# Patient Record
Sex: MALE | Race: WHITE | NOT HISPANIC OR LATINO | Employment: OTHER | URBAN - METROPOLITAN AREA
[De-identification: names, ages, dates, MRNs, and addresses within clinical notes are randomized per-mention and may not be internally consistent; named-entity substitution may affect disease eponyms.]

---

## 2023-03-29 DIAGNOSIS — E78.5 HYPERLIPIDEMIA, UNSPECIFIED HYPERLIPIDEMIA TYPE: ICD-10-CM

## 2023-03-29 DIAGNOSIS — I10 PRIMARY HYPERTENSION: Primary | ICD-10-CM

## 2023-03-29 RX ORDER — LISINOPRIL 5 MG/1
1 TABLET ORAL DAILY
COMMUNITY
End: 2023-03-29 | Stop reason: SDUPTHER

## 2023-03-29 RX ORDER — ATORVASTATIN CALCIUM 40 MG/1
TABLET, FILM COATED ORAL
COMMUNITY
Start: 2018-06-01 | End: 2023-03-29 | Stop reason: SDUPTHER

## 2023-03-29 RX ORDER — LISINOPRIL 5 MG/1
5 TABLET ORAL DAILY
Qty: 90 TABLET | Refills: 1 | Status: SHIPPED | OUTPATIENT
Start: 2023-03-29 | End: 2023-11-29 | Stop reason: SDUPTHER

## 2023-03-29 RX ORDER — ATORVASTATIN CALCIUM 40 MG/1
40 TABLET, FILM COATED ORAL DAILY
Qty: 90 TABLET | Refills: 1 | Status: SHIPPED | OUTPATIENT
Start: 2023-03-29 | End: 2023-11-29 | Stop reason: SDUPTHER

## 2023-04-17 DIAGNOSIS — N40.1 BENIGN PROSTATIC HYPERPLASIA WITH LOWER URINARY TRACT SYMPTOMS, SYMPTOM DETAILS UNSPECIFIED: Primary | ICD-10-CM

## 2023-04-17 RX ORDER — TADALAFIL 5 MG/1
1 TABLET ORAL DAILY
COMMUNITY
Start: 2023-03-25 | End: 2023-04-17 | Stop reason: SDUPTHER

## 2023-04-17 RX ORDER — TADALAFIL 5 MG/1
5 TABLET ORAL DAILY
Qty: 90 TABLET | Refills: 3 | Status: SHIPPED | OUTPATIENT
Start: 2023-04-17 | End: 2024-04-17 | Stop reason: SDUPTHER

## 2023-06-18 ASSESSMENT — ENCOUNTER SYMPTOMS
FEVER: 1
SORE THROAT: 0
DYSURIA: 0
VOMITING: 0
NAUSEA: 0
WHEEZING: 0
HEADACHES: 0
ABDOMINAL PAIN: 0
DIARRHEA: 0
COUGH: 0

## 2023-06-18 NOTE — PROGRESS NOTES
Subjective   Patient ID: Vishnu Waller is a 77 y.o. male  with history of HTN, HLD, BPH who presents for Follow-up (UC, fever).  Fever   This is a new problem. The current episode started 1 to 4 weeks ago. The problem occurs rarely. The problem has been resolved. The maximum temperature noted was 103 to 103.9 F. The temperature was taken using an oral thermometer. Pertinent negatives include no abdominal pain, chest pain, congestion, coughing, diarrhea, ear pain, headaches, muscle aches, nausea, rash, sleepiness, sore throat, urinary pain, vomiting or wheezing.          Concerns today:  #Recent fever in [place]  -Arrived in [facility] on 6/4  -On 6/7, ate lunch, 3-4 hours later developed abdominal pain, 3 hours after that, he developed full body shakes and teeth chattering  -Fever 103, delirious, urinating on himself which is unusual  -Went to ED and got fluids   -Next morning, temp was 101.5 and took 2 tylenol, fever broke  -Had fever to 100.4 the next day, then resolved   -Two sick contacts with COVID  -All of his COVID tests were negative  -Got a UA in [facility] with small amount of blood, did not get a urine culture  -Got home Wednesday morning last week, played pickleball last week  -Now feeling like himself     Chronic issues:   #Erectile dysfunction/BPH  -Cialis 5mg QHS for both ED and BPH     #Minor stroke, MCA stenosis 2018  -ASA and atorva 40     #HTN  -lisinopril 5mg daily     #Knee OA   -Sees Dr. Ronald carias-last visit 11/16/22  -Received steroid injection     #Hx of MADINA     Health maintenance:  -UTD flu and covid vaccines  -UTD pneumococcal  -Tdap 2016  -Shingrix?-- will inquire at next visit   -do not see record of AAA screen- now over 74 yo, so not indicated   -Colonoscopy 2018 with 2 polyps removed    Objective   Visit Vitals  /87   Pulse 58   Temp 36.5 °C (97.7 °F)   Wt 83.5 kg (184 lb)   SpO2 95%   BMI 25.66 kg/m²   BSA 2.04 m²      Physical Exam  General: Well appearing, conversational, in no acute  distress  HEENT: EOMI, PERRL, nares patent without congestion, MMM, TMs clear bilaterally   CV: RRR, no murmurs  Resp: Lungs CTAB, normal work of breathing  GI: Soft, nondistended, nontender, BS+   Ext: Trace lower extremity swelling  Skin: Warm, dry, no rashes  Neuro: Awake, alert, oriented x3, moving all 4 extremities, nonfocal, normal gait, ambulates without assistance  Psych: Appropriate mood and affect      Assessment/Plan   Vishnu Waller is a 77 y.o. male who presents for follow-up from several days of fever (6/7-6/10) in [facility] with Tmax of 103 and no clear associated symptoms. He was delirious with fever, improved once he received tylenol and fluids. He had negative COVID and normal CBC. His UA showed some RBCs, otherwise normal. He most likely had a viral illness since it resolved without abx, though will repeat UA to ensure he no longer has RBCs. He may have had food poisoning, but he only had abdominal pain and gagging, no diarrhea or vomiting. He is very well appearing and back to himself, so will just check UA today.    Problem List Items Addressed This Visit    None  Visit Diagnoses       Hematuria, unspecified type    -  Primary    Relevant Orders    Urinalysis with Reflex Microscopic        Follow up in October for Medicare Annual Wellness          Alfreda Soto MD MPH

## 2023-06-19 ENCOUNTER — OFFICE VISIT (OUTPATIENT)
Dept: PRIMARY CARE | Facility: CLINIC | Age: 78
End: 2023-06-19
Payer: MEDICARE

## 2023-06-19 VITALS
TEMPERATURE: 97.7 F | DIASTOLIC BLOOD PRESSURE: 87 MMHG | SYSTOLIC BLOOD PRESSURE: 146 MMHG | OXYGEN SATURATION: 95 % | BODY MASS INDEX: 25.66 KG/M2 | HEART RATE: 58 BPM | WEIGHT: 184 LBS

## 2023-06-19 DIAGNOSIS — R31.9 HEMATURIA, UNSPECIFIED TYPE: Primary | ICD-10-CM

## 2023-06-19 LAB
APPEARANCE, URINE: CLEAR
BILIRUBIN, URINE: NEGATIVE
BLOOD, URINE: NEGATIVE
COLOR, URINE: YELLOW
GLUCOSE, URINE: NEGATIVE MG/DL
KETONES, URINE: NEGATIVE MG/DL
LEUKOCYTE ESTERASE, URINE: NEGATIVE
NITRITE, URINE: NEGATIVE
PH, URINE: 6 (ref 5–8)
PROTEIN, URINE: NEGATIVE MG/DL
SPECIFIC GRAVITY, URINE: 1.02 (ref 1–1.03)
UROBILINOGEN, URINE: <2 MG/DL (ref 0–1.9)

## 2023-06-19 PROCEDURE — 99213 OFFICE O/P EST LOW 20 MIN: CPT | Performed by: STUDENT IN AN ORGANIZED HEALTH CARE EDUCATION/TRAINING PROGRAM

## 2023-06-19 PROCEDURE — 81003 URINALYSIS AUTO W/O SCOPE: CPT

## 2023-06-19 PROCEDURE — 1157F ADVNC CARE PLAN IN RCRD: CPT | Performed by: STUDENT IN AN ORGANIZED HEALTH CARE EDUCATION/TRAINING PROGRAM

## 2023-06-19 NOTE — PATIENT INSTRUCTIONS
Thank you for coming today Vishnu!     I agree you likely had a viral illness such as adenovirus. We are rechecking your urine since they saw blood in it. I will let you know the results.    I am glad you are feeling better!    I will see you in October for your wellness or sooner if needed!

## 2023-07-25 ENCOUNTER — OFFICE VISIT (OUTPATIENT)
Dept: PRIMARY CARE | Facility: CLINIC | Age: 78
End: 2023-07-25
Payer: MEDICARE

## 2023-07-25 VITALS
DIASTOLIC BLOOD PRESSURE: 82 MMHG | OXYGEN SATURATION: 98 % | BODY MASS INDEX: 26.22 KG/M2 | WEIGHT: 188 LBS | SYSTOLIC BLOOD PRESSURE: 130 MMHG | HEART RATE: 61 BPM

## 2023-07-25 DIAGNOSIS — M70.21 OLECRANON BURSITIS OF RIGHT ELBOW: Primary | ICD-10-CM

## 2023-07-25 PROCEDURE — 99213 OFFICE O/P EST LOW 20 MIN: CPT | Performed by: STUDENT IN AN ORGANIZED HEALTH CARE EDUCATION/TRAINING PROGRAM

## 2023-07-25 PROCEDURE — 1157F ADVNC CARE PLAN IN RCRD: CPT | Performed by: STUDENT IN AN ORGANIZED HEALTH CARE EDUCATION/TRAINING PROGRAM

## 2023-07-25 PROCEDURE — 1126F AMNT PAIN NOTED NONE PRSNT: CPT | Performed by: STUDENT IN AN ORGANIZED HEALTH CARE EDUCATION/TRAINING PROGRAM

## 2023-07-25 ASSESSMENT — ENCOUNTER SYMPTOMS: FEVER: 1

## 2023-07-25 NOTE — PROGRESS NOTES
Subjective   Patient ID: Vishnu Waller is a 77 y.o. male  with history of HTN, HLD, BPH who presents for Follow-up.    Last seen on 6/19 for hematuria, confusion, and fever he developed while in [place]. This has completely resolved.      HPI:  Concerns today:  #R elbow mass  -Feels like he has some tennis elbow in R elbow  -Noticed a fluid filled mass that is starting to go down in size  -Not taking anything for pain  -Not red or warm  -Uses a paddle that is supposed to help protect elbow  -Still playing pickleball  -Made an appointment with Del Plummer for 9/26    Chronic issues:   #Erectile dysfunction/BPH  -Cialis 5mg QHS for both ED and BPH     #Minor stroke, MCA stenosis 2018  -ASA and atorva 40     #HTN  -lisinopril 5mg daily     #Knee OA   -Sees Dr. Ronald carias-last visit 11/16/22  -Received steroid injection     #Hx of MADINA     Health maintenance:  -UTD flu and covid vaccines  -UTD pneumococcal  -Tdap 2016  -Shingrix?  -do not see record of AAA screen- now over 74 yo, so not indicated   -Colonoscopy 2018 with 2 polyps removed    Objective   Visit Vitals  /82   Pulse 61   Wt 85.3 kg (188 lb)   SpO2 98%   BMI 26.22 kg/m²   BSA 2.07 m²      Physical Exam  General: Well appearing, conversational, in no acute distress  HEENT: EOMI, MMM  Resp: Normal work of breathing  GI: Soft, nondistended  MSK: R elbow with mobile 3cm fluid filled bursa without overlying skin changes, normal range of motion of elbow   Skin: Warm, dry, no rashes  Neuro: Awake, alert, oriented x3, moving all 4 extremities, nonfocal, normal gait, ambulates without assistance  Psych: Appropriate mood and affect      Assessment/Plan   Vishnu Waller is a 77 y.o. male  with history of HTN, HLD, BPH who presents for R elbow mass most consistent with olecranon bursitis. It is not causing any issues with joint movement or pain, so will monitor it for now. No signs of infection. Recommended compression sleeve.   Problem List Items Addressed This  Visit    None  Visit Diagnoses       Olecranon bursitis of right elbow    -  Primary        Follow up in October for Medicare Annual Wellness          Alfreda Soto MD MPH

## 2023-07-25 NOTE — PATIENT INSTRUCTIONS
Thank you for coming today Vishnu!    You should use an elbow compression sleeve to help protect your elbow.     Often the fluid goes down over a couple months. Every once in a while, the bursa develops a thick capsule and does not go down in size. An orthopedist should be able to remove this surgically if desired.     If the bursa increases in size and becomes more tense, warm, or red, please return to clinic.

## 2023-07-26 ENCOUNTER — APPOINTMENT (OUTPATIENT)
Dept: PRIMARY CARE | Facility: CLINIC | Age: 78
End: 2023-07-26
Payer: MEDICARE

## 2023-09-29 ENCOUNTER — TELEPHONE (OUTPATIENT)
Dept: PRIMARY CARE | Facility: CLINIC | Age: 78
End: 2023-09-29
Payer: MEDICARE

## 2023-09-30 DIAGNOSIS — Z00.00 HEALTHCARE MAINTENANCE: ICD-10-CM

## 2023-09-30 DIAGNOSIS — Z13.220 LIPID SCREENING: ICD-10-CM

## 2023-09-30 DIAGNOSIS — N40.1 BENIGN LOCALIZED PROSTATIC HYPERPLASIA WITH LOWER URINARY TRACT SYMPTOMS (LUTS): ICD-10-CM

## 2023-09-30 DIAGNOSIS — Z86.2 HISTORY OF ANEMIA: ICD-10-CM

## 2023-09-30 DIAGNOSIS — Z12.5 PROSTATE CANCER SCREENING: ICD-10-CM

## 2023-09-30 DIAGNOSIS — R31.9 HEMATURIA, UNSPECIFIED TYPE: Primary | ICD-10-CM

## 2023-10-17 ENCOUNTER — APPOINTMENT (OUTPATIENT)
Dept: ORTHOPEDIC SURGERY | Facility: HOSPITAL | Age: 78
End: 2023-10-17
Payer: MEDICARE

## 2023-11-02 ENCOUNTER — LAB (OUTPATIENT)
Dept: LAB | Facility: LAB | Age: 78
End: 2023-11-02
Payer: MEDICARE

## 2023-11-02 DIAGNOSIS — Z13.220 LIPID SCREENING: ICD-10-CM

## 2023-11-02 DIAGNOSIS — N40.1 BENIGN LOCALIZED PROSTATIC HYPERPLASIA WITH LOWER URINARY TRACT SYMPTOMS (LUTS): ICD-10-CM

## 2023-11-02 DIAGNOSIS — Z12.5 PROSTATE CANCER SCREENING: ICD-10-CM

## 2023-11-02 DIAGNOSIS — Z00.00 HEALTHCARE MAINTENANCE: ICD-10-CM

## 2023-11-02 DIAGNOSIS — Z86.2 HISTORY OF ANEMIA: ICD-10-CM

## 2023-11-02 LAB
ALBUMIN SERPL BCP-MCNC: 4 G/DL (ref 3.4–5)
ALP SERPL-CCNC: 80 U/L (ref 33–136)
ALT SERPL W P-5'-P-CCNC: 21 U/L (ref 10–52)
ANION GAP SERPL CALC-SCNC: 13 MMOL/L (ref 10–20)
AST SERPL W P-5'-P-CCNC: 24 U/L (ref 9–39)
BASOPHILS # BLD AUTO: 0.05 X10*3/UL (ref 0–0.1)
BASOPHILS NFR BLD AUTO: 0.8 %
BILIRUB SERPL-MCNC: 1 MG/DL (ref 0–1.2)
BUN SERPL-MCNC: 19 MG/DL (ref 6–23)
CALCIUM SERPL-MCNC: 9 MG/DL (ref 8.6–10.6)
CHLORIDE SERPL-SCNC: 105 MMOL/L (ref 98–107)
CHOLEST SERPL-MCNC: 136 MG/DL (ref 0–199)
CHOLESTEROL/HDL RATIO: 2.7
CO2 SERPL-SCNC: 27 MMOL/L (ref 21–32)
CREAT SERPL-MCNC: 0.75 MG/DL (ref 0.5–1.3)
EOSINOPHIL # BLD AUTO: 0.32 X10*3/UL (ref 0–0.4)
EOSINOPHIL NFR BLD AUTO: 5.2 %
ERYTHROCYTE [DISTWIDTH] IN BLOOD BY AUTOMATED COUNT: 13.2 % (ref 11.5–14.5)
EST. AVERAGE GLUCOSE BLD GHB EST-MCNC: 123 MG/DL
GFR SERPL CREATININE-BSD FRML MDRD: >90 ML/MIN/1.73M*2
GLUCOSE SERPL-MCNC: 95 MG/DL (ref 74–99)
HBA1C MFR BLD: 5.9 %
HCT VFR BLD AUTO: 45.7 % (ref 41–52)
HDLC SERPL-MCNC: 50.9 MG/DL
HGB BLD-MCNC: 15 G/DL (ref 13.5–17.5)
IMM GRANULOCYTES # BLD AUTO: 0.02 X10*3/UL (ref 0–0.5)
IMM GRANULOCYTES NFR BLD AUTO: 0.3 % (ref 0–0.9)
LDLC SERPL CALC-MCNC: 76 MG/DL
LYMPHOCYTES # BLD AUTO: 2.24 X10*3/UL (ref 0.8–3)
LYMPHOCYTES NFR BLD AUTO: 36.6 %
MCH RBC QN AUTO: 29.7 PG (ref 26–34)
MCHC RBC AUTO-ENTMCNC: 32.8 G/DL (ref 32–36)
MCV RBC AUTO: 91 FL (ref 80–100)
MONOCYTES # BLD AUTO: 0.59 X10*3/UL (ref 0.05–0.8)
MONOCYTES NFR BLD AUTO: 9.6 %
NEUTROPHILS # BLD AUTO: 2.9 X10*3/UL (ref 1.6–5.5)
NEUTROPHILS NFR BLD AUTO: 47.5 %
NON HDL CHOLESTEROL: 85 MG/DL (ref 0–149)
NRBC BLD-RTO: 0 /100 WBCS (ref 0–0)
PLATELET # BLD AUTO: 178 X10*3/UL (ref 150–450)
POTASSIUM SERPL-SCNC: 4.2 MMOL/L (ref 3.5–5.3)
PROT SERPL-MCNC: 6.8 G/DL (ref 6.4–8.2)
PSA SERPL-MCNC: 3.33 NG/ML
RBC # BLD AUTO: 5.05 X10*6/UL (ref 4.5–5.9)
SODIUM SERPL-SCNC: 141 MMOL/L (ref 136–145)
TRIGL SERPL-MCNC: 46 MG/DL (ref 0–149)
VLDL: 9 MG/DL (ref 0–40)
WBC # BLD AUTO: 6.1 X10*3/UL (ref 4.4–11.3)

## 2023-11-02 PROCEDURE — 85025 COMPLETE CBC W/AUTO DIFF WBC: CPT

## 2023-11-02 PROCEDURE — 36415 COLL VENOUS BLD VENIPUNCTURE: CPT

## 2023-11-02 PROCEDURE — 80061 LIPID PANEL: CPT

## 2023-11-02 PROCEDURE — G0103 PSA SCREENING: HCPCS

## 2023-11-02 PROCEDURE — 80053 COMPREHEN METABOLIC PANEL: CPT

## 2023-11-02 PROCEDURE — 83036 HEMOGLOBIN GLYCOSYLATED A1C: CPT

## 2023-11-06 ENCOUNTER — OFFICE VISIT (OUTPATIENT)
Dept: PRIMARY CARE | Facility: CLINIC | Age: 78
End: 2023-11-06
Payer: MEDICARE

## 2023-11-06 VITALS
BODY MASS INDEX: 25.94 KG/M2 | DIASTOLIC BLOOD PRESSURE: 84 MMHG | HEART RATE: 61 BPM | SYSTOLIC BLOOD PRESSURE: 132 MMHG | WEIGHT: 186 LBS | OXYGEN SATURATION: 98 %

## 2023-11-06 DIAGNOSIS — Z00.00 ANNUAL PHYSICAL EXAM: Primary | ICD-10-CM

## 2023-11-06 DIAGNOSIS — H91.93 BILATERAL HEARING LOSS, UNSPECIFIED HEARING LOSS TYPE: ICD-10-CM

## 2023-11-06 PROCEDURE — 1036F TOBACCO NON-USER: CPT | Performed by: STUDENT IN AN ORGANIZED HEALTH CARE EDUCATION/TRAINING PROGRAM

## 2023-11-06 PROCEDURE — 1170F FXNL STATUS ASSESSED: CPT | Performed by: STUDENT IN AN ORGANIZED HEALTH CARE EDUCATION/TRAINING PROGRAM

## 2023-11-06 PROCEDURE — G0439 PPPS, SUBSEQ VISIT: HCPCS | Performed by: STUDENT IN AN ORGANIZED HEALTH CARE EDUCATION/TRAINING PROGRAM

## 2023-11-06 PROCEDURE — 3079F DIAST BP 80-89 MM HG: CPT | Performed by: STUDENT IN AN ORGANIZED HEALTH CARE EDUCATION/TRAINING PROGRAM

## 2023-11-06 PROCEDURE — 93000 ELECTROCARDIOGRAM COMPLETE: CPT | Performed by: STUDENT IN AN ORGANIZED HEALTH CARE EDUCATION/TRAINING PROGRAM

## 2023-11-06 PROCEDURE — 3075F SYST BP GE 130 - 139MM HG: CPT | Performed by: STUDENT IN AN ORGANIZED HEALTH CARE EDUCATION/TRAINING PROGRAM

## 2023-11-06 PROCEDURE — 1159F MED LIST DOCD IN RCRD: CPT | Performed by: STUDENT IN AN ORGANIZED HEALTH CARE EDUCATION/TRAINING PROGRAM

## 2023-11-06 PROCEDURE — 1126F AMNT PAIN NOTED NONE PRSNT: CPT | Performed by: STUDENT IN AN ORGANIZED HEALTH CARE EDUCATION/TRAINING PROGRAM

## 2023-11-06 PROCEDURE — 1160F RVW MEDS BY RX/DR IN RCRD: CPT | Performed by: STUDENT IN AN ORGANIZED HEALTH CARE EDUCATION/TRAINING PROGRAM

## 2023-11-06 NOTE — PATIENT INSTRUCTIONS
Thank you for coming today Vishun!    Please schedule with urology. For urology, I would recommend [facility]. The urology phone number is [phone].    Please schedule your audiology appointment. You can call [phone].    Please schedule your shingles vaccine at the pharmacy.      In general for sleep, I recommend keeping a good bedtime routine with something to wind you down like reading a book or listening to something soothing (I like [employer] sleepcasts, you can get [employer] sleepcast for free on [employer]).    If you drink caffeine, try cutting down on this and only drinking it before noon. Exercising in the afternoon can make you more tired in the evening.    You can take 1mg of melatonin 2 hours before you want to go to sleep to help with sleep initiation as well.      I will see you in 6 months or sooner if needed!

## 2023-11-06 NOTE — PROGRESS NOTES
Subjective   Patient ID: Vishnu Waller is a 77 y.o. male  with history of HTN, HLD, BPH, MCA stenosis 2018 who presents for Annual Exam.      HPI:  Concerns today:  Reviewed labs which were reassuring    #Change in BM  -Saw Dr. Barnard at [facility]  Trial of Hydration and metamucil  Metamucil had started helping when took it 15 minutes before eating     #Olecranon bursitis-- resolved  -Seen in July  -Used compression with ace wrap which helped fluid go down  -Still some tendinitis  -Planning to see Dr. Plummer this month  -Playing pickle ball with racket that is supposed to help prevent tendonitis     #Sleep  -Not sleeping very deeply     #Hearing loss  -Mild    Chronic issues:   #Erectile dysfunction/BPH  -Cialis 5mg QHS for both ED and BPH     #Minor stroke, MCA stenosis 2018  -ASA and atorva 40     #HTN  -lisinopril 5mg daily     #Knee OA   -Sees Dr. Cardenas ortho  -Received steroid injection     #Hx of MADINA  -2017    #Fungal toenails  -Sees podiatry    #Hx of cataracts  -Surgery in 2012    #Glaucoma     Health maintenance:  -UTD flu and covid vaccines  -UTD pneumococcal  -Tdap 2016  -Colonoscopy 2018 with 2 polyps removed-- planning to get colonoscopy this month with Dr. Llanes   [facility]    Social history:  -Michelle wife is also a patient   -3 children Daughter internist, 9 grandchildren   -Originally from [facility]   -Previously smoked 1.5 ppd for 22 years and quit in 1986  -Does not drink alcohol     Family history:   -Mother with heart disease and stroke     Objective   Visit Vitals  /84   Pulse 61   Wt 84.4 kg (186 lb)   SpO2 98%   BMI 25.94 kg/m²   Smoking Status Former   BSA 2.06 m²        Physical Exam  General: Well appearing, conversational, in no acute distress  HEENT: EOMI, MMM  CV: RRR, no murmurs   Resp: CTAB, Normal work of breathing  GI: Soft, nondistended  MSK: R elbow with resolution of bursitis, normal range of motion of elbow   Skin: Warm, dry, no rashes  Neuro: Awake, alert, oriented x3, moving  all 4 extremities, nonfocal, normal gait, ambulates without assistance  Psych: Appropriate mood and affect      ECG: NSR, no changes from baseline    Assessment/Plan   Vishnu Waller is a 77 y.o. male  with history of HTN, HLD, BPH, MCA stenosis 2018 who presents for Annual Exam. Doing well overall. No changes made to medications. Labs were overall reassuring. Referred to audiology for hearing loss. Discussed getting shingles vaccine at the pharmacy. Discussed sleep routine and possibly trying melatonin.     Problem List Items Addressed This Visit    None  Visit Diagnoses       Annual physical exam    -  Primary    Relevant Orders    ECG 12 lead (Clinic Performed)    Bilateral hearing loss, unspecified hearing loss type        Relevant Orders    Referral to Audiology               Alfreda Soto MD MPH

## 2023-11-12 PROBLEM — I10 BENIGN ESSENTIAL HYPERTENSION: Status: ACTIVE | Noted: 2023-11-12

## 2023-11-12 PROBLEM — R91.8 MULTIPLE LUNG NODULES: Status: ACTIVE | Noted: 2023-11-12

## 2023-11-12 PROBLEM — M25.551 RIGHT HIP PAIN: Status: ACTIVE | Noted: 2023-11-12

## 2023-11-12 PROBLEM — N40.1 BENIGN PROSTATIC HYPERPLASIA WITH LOWER URINARY TRACT SYMPTOMS: Status: ACTIVE | Noted: 2023-11-12

## 2023-11-12 PROBLEM — I66.9 CEREBRAL EMBOLISM: Status: ACTIVE | Noted: 2023-11-12

## 2023-11-12 RX ORDER — NAPROXEN SODIUM 220 MG/1
1 TABLET, FILM COATED ORAL DAILY
COMMUNITY
Start: 2018-06-01

## 2023-11-12 ASSESSMENT — PATIENT HEALTH QUESTIONNAIRE - PHQ9
2. FEELING DOWN, DEPRESSED OR HOPELESS: NOT AT ALL
1. LITTLE INTEREST OR PLEASURE IN DOING THINGS: NOT AT ALL
SUM OF ALL RESPONSES TO PHQ9 QUESTIONS 1 AND 2: 0

## 2023-11-12 ASSESSMENT — ACTIVITIES OF DAILY LIVING (ADL)
MANAGING_FINANCES: INDEPENDENT
TAKING_MEDICATION: INDEPENDENT
DRESSING: INDEPENDENT
GROCERY_SHOPPING: INDEPENDENT
BATHING: INDEPENDENT
DOING_HOUSEWORK: INDEPENDENT

## 2023-11-12 ASSESSMENT — ENCOUNTER SYMPTOMS
LOSS OF SENSATION IN FEET: 0
DEPRESSION: 0
OCCASIONAL FEELINGS OF UNSTEADINESS: 0

## 2023-11-21 ENCOUNTER — OFFICE VISIT (OUTPATIENT)
Dept: ORTHOPEDIC SURGERY | Facility: HOSPITAL | Age: 78
End: 2023-11-21
Payer: MEDICARE

## 2023-11-21 ENCOUNTER — HOSPITAL ENCOUNTER (OUTPATIENT)
Dept: RADIOLOGY | Facility: HOSPITAL | Age: 78
Discharge: HOME | End: 2023-11-21
Payer: MEDICARE

## 2023-11-21 DIAGNOSIS — M25.529 ELBOW PAIN, UNSPECIFIED LATERALITY: ICD-10-CM

## 2023-11-21 DIAGNOSIS — M25.529 ELBOW PAIN, UNSPECIFIED LATERALITY: Primary | ICD-10-CM

## 2023-11-21 PROCEDURE — 73080 X-RAY EXAM OF ELBOW: CPT | Mod: RT

## 2023-11-21 PROCEDURE — 73080 X-RAY EXAM OF ELBOW: CPT | Mod: RIGHT SIDE | Performed by: RADIOLOGY

## 2023-11-21 PROCEDURE — 1160F RVW MEDS BY RX/DR IN RCRD: CPT | Performed by: ORTHOPAEDIC SURGERY

## 2023-11-21 PROCEDURE — 1036F TOBACCO NON-USER: CPT | Performed by: ORTHOPAEDIC SURGERY

## 2023-11-21 PROCEDURE — 99213 OFFICE O/P EST LOW 20 MIN: CPT | Performed by: ORTHOPAEDIC SURGERY

## 2023-11-21 PROCEDURE — 1159F MED LIST DOCD IN RCRD: CPT | Performed by: ORTHOPAEDIC SURGERY

## 2023-11-21 PROCEDURE — 1126F AMNT PAIN NOTED NONE PRSNT: CPT | Performed by: ORTHOPAEDIC SURGERY

## 2023-11-21 NOTE — PROGRESS NOTES
Subjective    Patient ID: Vishnu Waller is a 78 y.o. male.    Chief Complaint: Right elbow pain      Last Surgery: No surgery found  Last Surgery Date: No surgery found    Vishnu is a 78 y.o. right hand dominant male presenting today for evaluation of their right elbow.     He is an avid pickle ball player. He plays about 3 times a week. He was previously diagnosed with tennis elbow and bursitis. He saw his saw PCP for this. Since then, he has had improvement since that visit,  except when he bumps it. He has not had any therapy or injections. He is here to have his elbow assessed.     Past medical history, surgical history, social history, and family history were all reviewed and are as per the [facility] patient health history questionnaire form that I signed and scanned into the chart today.          Objective   Patient is a well-developed, well-nourished male in no acute distress.  Breathes with normal chest rises.  Pupils are round and symmetric today.  Awake, alert, and oriented x3.     Examination of the left elbow today reveals the skin to be intact. No evidence of ecchymosis, bruising, lesions, or scarring. The epitrochlear lymph node exam at the time of visit was negative bilaterally. 5 out of 5 wrist flexion, wrist extension, and thumb extension bilaterally. Sensation is intact to light touch to median, ulnar, and radial nerves bilaterally. Stable to varus and valgus stress bilaterally.  Range of motion of the left elbow revealed 0-150° of flexion and extension and 85 degrees of supination and pronation. Patient had no tenderness to palpation at their medial or lateral epicondyle.      Examination of the right elbow today reveals the skin to be intact. No evidence of ecchymosis, bruising, lesions, or scarring. The epitrochlear lymph node exam at the time of visit was negative bilaterally. 5 out of 5 wrist flexion, wrist extension, and thumb extension bilaterally. Sensation is intact to light touch to median,  ulnar, and radial nerves bilaterally. Stable to varus and valgus stress bilaterally.  Range of motion of the right elbow revealed 0-150° of flexion and extension and 85 degrees of supination and pronation. Patient had no tenderness to palpation at their medial or lateral epicondyle.       Image Results:      Assessment/Plan   Encounter Diagnoses:  Elbow pain, unspecified laterality  Patient with olecranon bursitis that is mostly resolved at this time    We had a long discussion regarding his diagnosis. We talked about his treatment options. I assured him that there is nothing to worry about at this time. He may continue to live with this, try aspirating the bursa, or have this surgically addressed. He declined any further treatment today, but understands we are happy to see him back if he has any further issues.   Orders Placed This Encounter    XR elbow right T 3+ views     Follow up as needed    Scribe Attestation  By signing my name below, IMary Grace , Scribemma   attest that this documentation has been prepared under the direction and in the presence of Del Plummer MD.

## 2023-11-29 DIAGNOSIS — I10 PRIMARY HYPERTENSION: ICD-10-CM

## 2023-11-29 DIAGNOSIS — E78.5 HYPERLIPIDEMIA, UNSPECIFIED HYPERLIPIDEMIA TYPE: ICD-10-CM

## 2023-11-29 RX ORDER — LISINOPRIL 5 MG/1
5 TABLET ORAL DAILY
Qty: 90 TABLET | Refills: 1 | Status: SHIPPED | OUTPATIENT
Start: 2023-11-29 | End: 2024-05-27

## 2023-11-29 RX ORDER — ATORVASTATIN CALCIUM 40 MG/1
40 TABLET, FILM COATED ORAL DAILY
Qty: 90 TABLET | Refills: 1 | Status: SHIPPED | OUTPATIENT
Start: 2023-11-29 | End: 2024-05-24

## 2024-01-10 ENCOUNTER — CLINICAL SUPPORT (OUTPATIENT)
Dept: AUDIOLOGY | Facility: CLINIC | Age: 79
End: 2024-01-10
Payer: MEDICARE

## 2024-01-10 DIAGNOSIS — H90.3 SENSORINEURAL HEARING LOSS (SNHL) OF BOTH EARS: Primary | ICD-10-CM

## 2024-01-10 DIAGNOSIS — H91.93 BILATERAL HEARING LOSS, UNSPECIFIED HEARING LOSS TYPE: ICD-10-CM

## 2024-01-10 PROCEDURE — 92557 COMPREHENSIVE HEARING TEST: CPT | Performed by: AUDIOLOGIST

## 2024-01-10 PROCEDURE — 92550 TYMPANOMETRY & REFLEX THRESH: CPT | Performed by: AUDIOLOGIST

## 2024-01-10 NOTE — PROGRESS NOTES
Name: Vishnu Waller  YOB: 1945  Age: 78 y.o.    Date of Evaluation:  1/10/2024      History:      Patient presents today for hearing test at referral of Dr. Alfreda Soto. He reports known bilateral hearing loss however does not feel he is affected by this. His last known hearing test was at [facility] in 2016, at which time results revealed mild to moderately severe sensorineural hearing loss with excellent word discrimination.   Patient reports bilateral constant tinnitus; Denies dizziness, vertigo, falls.    Evaluation:    Otoscopy revealed clear ear canals bilaterally.  Immittance testing revealed normal middle ear function bilaterally.  Mild to moderately severe sensorineural hearing loss with excellent  word discrimination (RIGHT=96%, LEFT=100%)  Per summary from previous hearing test performed at outside facility, results seem to be stable.       Treatment Plan:    - Follow up with referring provider as directed  - Amplification pending medical clearance; if patient perception changes  - Retest hearing in 1-2 years, sooner as needed      [id number]    April Guerrero, CCC-A

## 2024-01-10 NOTE — LETTER
January 10, 2024     Alfreda Soto MD MPH  [address]  Pediatrics  [facility] [address]    Patient: Vishnu Waller   YOB: 1945   Date of Visit: 1/10/2024       Dear Dr. Alfreda Soto MD MPH:    Thank you for referring Vishnu Waller to me for evaluation. Below are my notes for this consultation.  If you have questions, please do not hesitate to call me. I look forward to following your patient along with you.       Sincerely,     JEN Perry, CCC-A      CC: No Recipients  ______________________________________________________________________________________    Name: Vishnu Waller  YOB: 1945  Age: 78 y.o.    Date of Evaluation:  1/10/2024      History:      Patient presents today for hearing test at referral of Dr. Alfreda Soto. He reports known bilateral hearing loss however does not feel he is affected by this. His last known hearing test was at [facility] in 2016, at which time results revealed mild to moderately severe sensorineural hearing loss with excellent word discrimination.   Patient reports bilateral constant tinnitus; Denies dizziness, vertigo, falls.    Evaluation:    Otoscopy revealed clear ear canals bilaterally.  Immittance testing revealed normal middle ear function bilaterally.  Mild to moderately severe sensorineural hearing loss with excellent  word discrimination (RIGHT=96%, LEFT=100%)  Per summary from previous hearing test performed at outside facility, results seem to be stable.       Treatment Plan:    - Follow up with referring provider as directed  - Amplification pending medical clearance; if patient perception changes  - Retest hearing in 1-2 years, sooner as needed      [id number]    April Guerrero, CCC-A

## 2024-02-01 ENCOUNTER — APPOINTMENT (OUTPATIENT)
Dept: ORTHOPEDIC SURGERY | Facility: CLINIC | Age: 79
End: 2024-02-01
Payer: MEDICARE

## 2024-02-22 ENCOUNTER — OFFICE VISIT (OUTPATIENT)
Dept: ORTHOPEDIC SURGERY | Facility: CLINIC | Age: 79
End: 2024-02-22
Payer: MEDICARE

## 2024-02-22 ENCOUNTER — HOSPITAL ENCOUNTER (OUTPATIENT)
Dept: RADIOLOGY | Facility: CLINIC | Age: 79
Discharge: HOME | End: 2024-02-22
Payer: MEDICARE

## 2024-02-22 DIAGNOSIS — M25.551 RIGHT HIP PAIN: ICD-10-CM

## 2024-02-22 DIAGNOSIS — M70.61 TROCHANTERIC BURSITIS OF RIGHT HIP: Primary | ICD-10-CM

## 2024-02-22 PROCEDURE — 1157F ADVNC CARE PLAN IN RCRD: CPT | Performed by: ORTHOPAEDIC SURGERY

## 2024-02-22 PROCEDURE — 1036F TOBACCO NON-USER: CPT | Performed by: ORTHOPAEDIC SURGERY

## 2024-02-22 PROCEDURE — 99213 OFFICE O/P EST LOW 20 MIN: CPT | Performed by: ORTHOPAEDIC SURGERY

## 2024-02-22 PROCEDURE — 1159F MED LIST DOCD IN RCRD: CPT | Performed by: ORTHOPAEDIC SURGERY

## 2024-02-22 PROCEDURE — 73502 X-RAY EXAM HIP UNI 2-3 VIEWS: CPT | Mod: RT

## 2024-02-22 PROCEDURE — 73502 X-RAY EXAM HIP UNI 2-3 VIEWS: CPT | Mod: RIGHT SIDE | Performed by: RADIOLOGY

## 2024-02-22 PROCEDURE — 1126F AMNT PAIN NOTED NONE PRSNT: CPT | Performed by: ORTHOPAEDIC SURGERY

## 2024-02-22 ASSESSMENT — PAIN - FUNCTIONAL ASSESSMENT: PAIN_FUNCTIONAL_ASSESSMENT: NO/DENIES PAIN

## 2024-02-23 NOTE — PROGRESS NOTES
Patient is a 70-year-old gentleman wh who has right total hip arthroplasty.  He is very active.  He plays pickle ball twice today.  He has some laterally based hip pain.  He takes Tylenol for pain relief.  He wanted make sure things okay with his hip.     Right hip:  AAOx3, NAD, walks with a non-antalgic gait  No pain with flexion internal rotation  Negative Stinchfield  Moderate TTP over trochanter laterally  5/5 Hip flexion/knee extension/df/pf/ehl  SILT in a mellissa/saph/per/tib distribution  ½ dorsalis pedis and posterior tibial pulse  no popliteal or inguinal lymphadenopathy  no other overlying lesions  mood: euthymic  Respirations non labored  Incision healed    X-rays reviewed by myself today in clinic reveal excellent alignment of the total hip arthroplasty components with no signs of early loosening or failure.    I discussed him today that his x-rays look great and his exam is very benign.  He can continue activity as tolerated.  He can continue to take Tylenol for pain relief.  He can ice and Voltaren gel.  All of his questions were answered.  He will follow-up with us as needed.    This note was created using voice recognition software and was not corrected for typographical or grammatical errors.

## 2024-04-17 DIAGNOSIS — N40.1 BENIGN PROSTATIC HYPERPLASIA WITH LOWER URINARY TRACT SYMPTOMS, SYMPTOM DETAILS UNSPECIFIED: ICD-10-CM

## 2024-04-17 RX ORDER — TADALAFIL 5 MG/1
5 TABLET ORAL DAILY
Qty: 90 TABLET | Refills: 3 | Status: SHIPPED | OUTPATIENT
Start: 2024-04-17 | End: 2025-04-17

## 2024-05-24 DIAGNOSIS — E78.5 HYPERLIPIDEMIA, UNSPECIFIED HYPERLIPIDEMIA TYPE: ICD-10-CM

## 2024-05-24 RX ORDER — ATORVASTATIN CALCIUM 40 MG/1
40 TABLET, FILM COATED ORAL DAILY
Qty: 90 TABLET | Refills: 1 | Status: SHIPPED | OUTPATIENT
Start: 2024-05-24

## 2024-05-26 DIAGNOSIS — I10 PRIMARY HYPERTENSION: ICD-10-CM

## 2024-05-27 RX ORDER — LISINOPRIL 5 MG/1
5 TABLET ORAL DAILY
Qty: 90 TABLET | Refills: 1 | Status: SHIPPED | OUTPATIENT
Start: 2024-05-27

## 2024-06-18 ENCOUNTER — APPOINTMENT (OUTPATIENT)
Dept: PRIMARY CARE | Facility: CLINIC | Age: 79
End: 2024-06-18
Payer: MEDICARE

## 2024-11-15 DIAGNOSIS — I10 PRIMARY HYPERTENSION: ICD-10-CM

## 2024-11-15 RX ORDER — LISINOPRIL 5 MG/1
5 TABLET ORAL DAILY
Qty: 90 TABLET | Refills: 1 | Status: SHIPPED | OUTPATIENT
Start: 2024-11-15

## 2024-11-21 DIAGNOSIS — E78.5 HYPERLIPIDEMIA, UNSPECIFIED HYPERLIPIDEMIA TYPE: ICD-10-CM

## 2024-11-21 RX ORDER — ATORVASTATIN CALCIUM 40 MG/1
40 TABLET, FILM COATED ORAL DAILY
Qty: 90 TABLET | Refills: 1 | Status: SHIPPED | OUTPATIENT
Start: 2024-11-21

## 2025-01-02 ENCOUNTER — APPOINTMENT (OUTPATIENT)
Dept: PRIMARY CARE | Facility: CLINIC | Age: 80
End: 2025-01-02
Payer: MEDICARE

## 2025-01-02 ENCOUNTER — LAB (OUTPATIENT)
Dept: LAB | Facility: LAB | Age: 80
End: 2025-01-02
Payer: MEDICARE

## 2025-01-02 VITALS
TEMPERATURE: 98.5 F | HEART RATE: 53 BPM | RESPIRATION RATE: 20 BRPM | OXYGEN SATURATION: 96 % | SYSTOLIC BLOOD PRESSURE: 138 MMHG | BODY MASS INDEX: 27.49 KG/M2 | HEIGHT: 70 IN | DIASTOLIC BLOOD PRESSURE: 76 MMHG | WEIGHT: 192 LBS

## 2025-01-02 DIAGNOSIS — R97.20 ELEVATED PSA: ICD-10-CM

## 2025-01-02 DIAGNOSIS — N40.1 BENIGN PROSTATIC HYPERPLASIA WITH URINARY FREQUENCY: ICD-10-CM

## 2025-01-02 DIAGNOSIS — E55.9 VITAMIN D DEFICIENCY: ICD-10-CM

## 2025-01-02 DIAGNOSIS — Z00.00 ANNUAL PHYSICAL EXAM: ICD-10-CM

## 2025-01-02 DIAGNOSIS — Z00.00 ANNUAL PHYSICAL EXAM: Primary | ICD-10-CM

## 2025-01-02 DIAGNOSIS — R35.0 BENIGN PROSTATIC HYPERPLASIA WITH URINARY FREQUENCY: ICD-10-CM

## 2025-01-02 LAB
ERYTHROCYTE [DISTWIDTH] IN BLOOD BY AUTOMATED COUNT: 12.8 % (ref 11.5–14.5)
HCT VFR BLD AUTO: 47.4 % (ref 41–52)
HGB BLD-MCNC: 15.1 G/DL (ref 13.5–17.5)
MCH RBC QN AUTO: 29.3 PG (ref 26–34)
MCHC RBC AUTO-ENTMCNC: 31.9 G/DL (ref 32–36)
MCV RBC AUTO: 92 FL (ref 80–100)
NRBC BLD-RTO: 0 /100 WBCS (ref 0–0)
PLATELET # BLD AUTO: 209 X10*3/UL (ref 150–450)
RBC # BLD AUTO: 5.15 X10*6/UL (ref 4.5–5.9)
WBC # BLD AUTO: 8.6 X10*3/UL (ref 4.4–11.3)

## 2025-01-02 PROCEDURE — 83718 ASSAY OF LIPOPROTEIN: CPT

## 2025-01-02 PROCEDURE — 82465 ASSAY BLD/SERUM CHOLESTEROL: CPT

## 2025-01-02 PROCEDURE — 1160F RVW MEDS BY RX/DR IN RCRD: CPT | Performed by: STUDENT IN AN ORGANIZED HEALTH CARE EDUCATION/TRAINING PROGRAM

## 2025-01-02 PROCEDURE — 80053 COMPREHEN METABOLIC PANEL: CPT

## 2025-01-02 PROCEDURE — 1159F MED LIST DOCD IN RCRD: CPT | Performed by: STUDENT IN AN ORGANIZED HEALTH CARE EDUCATION/TRAINING PROGRAM

## 2025-01-02 PROCEDURE — 84153 ASSAY OF PSA TOTAL: CPT

## 2025-01-02 PROCEDURE — 83036 HEMOGLOBIN GLYCOSYLATED A1C: CPT

## 2025-01-02 PROCEDURE — 85027 COMPLETE CBC AUTOMATED: CPT

## 2025-01-02 PROCEDURE — 1170F FXNL STATUS ASSESSED: CPT | Performed by: STUDENT IN AN ORGANIZED HEALTH CARE EDUCATION/TRAINING PROGRAM

## 2025-01-02 PROCEDURE — G0439 PPPS, SUBSEQ VISIT: HCPCS | Performed by: STUDENT IN AN ORGANIZED HEALTH CARE EDUCATION/TRAINING PROGRAM

## 2025-01-02 PROCEDURE — 1157F ADVNC CARE PLAN IN RCRD: CPT | Performed by: STUDENT IN AN ORGANIZED HEALTH CARE EDUCATION/TRAINING PROGRAM

## 2025-01-02 PROCEDURE — 1036F TOBACCO NON-USER: CPT | Performed by: STUDENT IN AN ORGANIZED HEALTH CARE EDUCATION/TRAINING PROGRAM

## 2025-01-02 PROCEDURE — 82306 VITAMIN D 25 HYDROXY: CPT

## 2025-01-02 PROCEDURE — 3075F SYST BP GE 130 - 139MM HG: CPT | Performed by: STUDENT IN AN ORGANIZED HEALTH CARE EDUCATION/TRAINING PROGRAM

## 2025-01-02 PROCEDURE — 3078F DIAST BP <80 MM HG: CPT | Performed by: STUDENT IN AN ORGANIZED HEALTH CARE EDUCATION/TRAINING PROGRAM

## 2025-01-02 NOTE — PATIENT INSTRUCTIONS
Thank you for coming today Vishnu!    Please get your blood work. You can get this at any  lab, the one here is on the lower level in [address].

## 2025-01-02 NOTE — PROGRESS NOTES
Subjective   Patient ID: Vishnu Waller is a 79 y.o. male  with history of HTN, HLD, BPH, MCA stenosis 2018 who presents for Annual Exam.      HPI:  Concerns today:  #Pain during pickleball  Vishnu reports always experiencing pain while playing pickleball, which they play twice a day. They mention that playing on different surfaces affects the game and their performance. The patient wishes they could move more during the game. They have been experiencing aches and pains, particularly in their knees and wrist.    The patient takes Tylenol for pain relief when it becomes severe and occasionally uses Icy Hot cream for its cooling effect. They report that after playing for three hours without stopping, their aches and pains become more pronounced.    #Leg swelling and knee issues  The patient experiences leg swelling and has a history of bad knees.    #Urinary frequency  They report waking up a couple of times a night to urinate but do not find it bothersome. The patient also reports that they often have a bowel movement when they urinate, particularly at night. They do not have diarrhea, and their bowel movements are solid.    #General health  Vishnu has been visiting a foot doctor and has a history of various aches and pains, but their major organs are reportedly fine. They have no issues with their heart, breathing, or bowel movements. The patient's hearing was checked and found to be okay, with no changes.    #Lifestyle  Vishnu is generally active, playing pickleball and swimming. They have a history of playing various sports, including tennis, ping-pong, basketball, and hockey. The patient is aware of the importance of a healthy lifestyle and tries to maintain one, although they admit to not drinking enough water and having gained some weight recently.    #Medications  They are currently taking tadalafil, atorvastatin, lisinopril, and aspirin. The patient had a previous adverse reaction to tamsulosin, which caused them to  "pass out.      Chronic issues:   #Erectile dysfunction/BPH  -Cialis 5mg QHS for both ED and BPH     #Minor stroke, MCA stenosis 2018  -ASA and atorva 40     #HTN  -lisinopril 5mg daily     #Knee OA   -Sees Dr. Cardenas ortho  -Received steroid injection     #Hx of MADINA  -2017    #Fungal toenails  -Sees podiatry    #Hx of cataracts  -Surgery in 2012    #Glaucoma     Health maintenance:  -UTD flu and covid vaccines  -UTD pneumococcal  -Tdap 2016  -Colonoscopy 2023 with 2 polyps removed- recommended 5 years  -Shingrix due    Social history:  -Michelle wife is also a patient   -3 children Daughter internist, 9 grandchildren   -Originally from [facility]   -Previously smoked 1.5 ppd for 22 years and quit in 1986  -Does not drink alcohol     Family history:   -Mother with heart disease and stroke     Objective   Visit Vitals  /76 (BP Location: Left arm, Patient Position: Sitting, BP Cuff Size: Adult)   Pulse 53   Temp 36.9 °C (98.5 °F) (Oral)   Resp 20   Ht 1.778 m (5' 10\")   Wt 87.1 kg (192 lb)   SpO2 96%   BMI 27.55 kg/m²   Smoking Status Former   BSA 2.07 m²        Physical Exam  General: Well appearing, conversational, in no acute distress  HEENT: EOMI, MMM, no nasal congestion, Tms clear bilaterally   CV: RRR, no murmurs   Resp: CTAB, Normal work of breathing  GI: Soft, nondistended  Skin: Warm, dry, no rashes  MSK: Ganglion cysts on wrists, also has evidence of right De Quervain's tenosynovitis of thumb  Neuro: Awake, alert, oriented x3, moving all 4 extremities, nonfocal, normal gait, ambulates without assistance  Psych: Appropriate mood and affect      Assessment/Plan   Vishnu Waller is a 79 y.o. male  with history of HTN, HLD, BPH, MCA stenosis 2018 who presents for Annual Exam.    Doing well overall aside from arthritis and muscle pains he gets from frequent pickleball    Will order routine bloodwork      Problem List Items Addressed This Visit       Benign prostatic hyperplasia with lower urinary tract symptoms    " Relevant Orders    Prostate Specific Antigen (Completed)     Other Visit Diagnoses       Annual physical exam    -  Primary    Relevant Orders    CBC (Completed)    Comprehensive Metabolic Panel (Completed)    Hemoglobin A1C (Completed)    Vitamin D 25-Hydroxy,Total (for eval of Vitamin D levels) (Completed)    Lipid Panel Non-Fasting (Completed)    Prostate Specific Antigen (Completed)    Vitamin D deficiency        Relevant Orders    CBC (Completed)    Vitamin D 25-Hydroxy,Total (for eval of Vitamin D levels) (Completed)                 Alfreda Soto MD MPH

## 2025-01-03 LAB
25(OH)D3 SERPL-MCNC: 21 NG/ML (ref 30–100)
ALBUMIN SERPL BCP-MCNC: 4.4 G/DL (ref 3.4–5)
ALP SERPL-CCNC: 139 U/L (ref 33–136)
ALT SERPL W P-5'-P-CCNC: 28 U/L (ref 10–52)
ANION GAP SERPL CALC-SCNC: 12 MMOL/L (ref 10–20)
AST SERPL W P-5'-P-CCNC: 29 U/L (ref 9–39)
BILIRUB SERPL-MCNC: 0.6 MG/DL (ref 0–1.2)
BUN SERPL-MCNC: 20 MG/DL (ref 6–23)
CALCIUM SERPL-MCNC: 9.3 MG/DL (ref 8.6–10.6)
CHLORIDE SERPL-SCNC: 103 MMOL/L (ref 98–107)
CHOLEST SERPL-MCNC: 163 MG/DL (ref 0–199)
CHOLESTEROL/HDL RATIO: 3
CO2 SERPL-SCNC: 31 MMOL/L (ref 21–32)
CREAT SERPL-MCNC: 0.93 MG/DL (ref 0.5–1.3)
EGFRCR SERPLBLD CKD-EPI 2021: 84 ML/MIN/1.73M*2
EST. AVERAGE GLUCOSE BLD GHB EST-MCNC: 126 MG/DL
GLUCOSE SERPL-MCNC: 104 MG/DL (ref 74–99)
HBA1C MFR BLD: 6 %
HDLC SERPL-MCNC: 54.2 MG/DL
NON-HDL CHOLESTEROL: 109 MG/DL (ref 0–149)
POTASSIUM SERPL-SCNC: 4.4 MMOL/L (ref 3.5–5.3)
PROT SERPL-MCNC: 7.2 G/DL (ref 6.4–8.2)
PSA SERPL-MCNC: 5.05 NG/ML
SODIUM SERPL-SCNC: 142 MMOL/L (ref 136–145)

## 2025-01-08 DIAGNOSIS — R97.20 ELEVATED PSA: Primary | ICD-10-CM

## 2025-01-08 DIAGNOSIS — R74.8 ELEVATED ALKALINE PHOSPHATASE LEVEL: ICD-10-CM

## 2025-01-10 ENCOUNTER — LAB (OUTPATIENT)
Dept: LAB | Facility: LAB | Age: 80
End: 2025-01-10
Payer: MEDICARE

## 2025-01-10 DIAGNOSIS — R97.20 ELEVATED PSA: ICD-10-CM

## 2025-01-10 DIAGNOSIS — R74.8 ELEVATED ALKALINE PHOSPHATASE LEVEL: ICD-10-CM

## 2025-01-10 LAB
ALP SERPL-CCNC: 135 U/L (ref 33–136)
PSA SERPL-MCNC: 5.07 NG/ML

## 2025-01-10 PROCEDURE — 84153 ASSAY OF PSA TOTAL: CPT

## 2025-01-10 PROCEDURE — 84075 ASSAY ALKALINE PHOSPHATASE: CPT

## 2025-01-12 ASSESSMENT — ENCOUNTER SYMPTOMS
DEPRESSION: 0
LOSS OF SENSATION IN FEET: 0
OCCASIONAL FEELINGS OF UNSTEADINESS: 0

## 2025-01-12 ASSESSMENT — ACTIVITIES OF DAILY LIVING (ADL)
DRESSING: INDEPENDENT
BATHING: INDEPENDENT
MANAGING_FINANCES: INDEPENDENT
GROCERY_SHOPPING: INDEPENDENT
TAKING_MEDICATION: INDEPENDENT
DOING_HOUSEWORK: INDEPENDENT

## 2025-01-23 ENCOUNTER — HOSPITAL ENCOUNTER (OUTPATIENT)
Dept: RADIOLOGY | Facility: HOSPITAL | Age: 80
Discharge: HOME | End: 2025-01-23
Payer: MEDICARE

## 2025-01-23 DIAGNOSIS — R97.20 ELEVATED PSA: ICD-10-CM

## 2025-01-23 PROCEDURE — 72197 MRI PELVIS W/O & W/DYE: CPT

## 2025-01-23 PROCEDURE — A9575 INJ GADOTERATE MEGLUMI 0.1ML: HCPCS | Performed by: STUDENT IN AN ORGANIZED HEALTH CARE EDUCATION/TRAINING PROGRAM

## 2025-01-23 PROCEDURE — 2550000001 HC RX 255 CONTRASTS: Performed by: STUDENT IN AN ORGANIZED HEALTH CARE EDUCATION/TRAINING PROGRAM

## 2025-01-23 PROCEDURE — 72197 MRI PELVIS W/O & W/DYE: CPT | Performed by: RADIOLOGY

## 2025-01-23 RX ORDER — GADOTERATE MEGLUMINE 376.9 MG/ML
17 INJECTION INTRAVENOUS
Status: COMPLETED | OUTPATIENT
Start: 2025-01-23 | End: 2025-01-23

## 2025-01-23 RX ADMIN — GADOTERATE MEGLUMINE 17 ML: 376.9 INJECTION INTRAVENOUS at 15:16

## 2025-02-04 ENCOUNTER — APPOINTMENT (OUTPATIENT)
Dept: UROLOGY | Facility: CLINIC | Age: 80
End: 2025-02-04
Payer: MEDICARE

## 2025-02-04 VITALS — HEIGHT: 70 IN | BODY MASS INDEX: 27.55 KG/M2 | TEMPERATURE: 97.4 F

## 2025-02-04 DIAGNOSIS — R97.20 ELEVATED PSA: ICD-10-CM

## 2025-02-04 DIAGNOSIS — N40.1 BENIGN PROSTATIC HYPERPLASIA WITH LOWER URINARY TRACT SYMPTOMS, SYMPTOM DETAILS UNSPECIFIED: Primary | ICD-10-CM

## 2025-02-04 DIAGNOSIS — F52.21 MALE ERECTILE DISORDER (CODE): ICD-10-CM

## 2025-02-04 PROCEDURE — G2211 COMPLEX E/M VISIT ADD ON: HCPCS | Performed by: UROLOGY

## 2025-02-04 PROCEDURE — 1157F ADVNC CARE PLAN IN RCRD: CPT | Performed by: UROLOGY

## 2025-02-04 PROCEDURE — 1036F TOBACCO NON-USER: CPT | Performed by: UROLOGY

## 2025-02-04 PROCEDURE — 1126F AMNT PAIN NOTED NONE PRSNT: CPT | Performed by: UROLOGY

## 2025-02-04 PROCEDURE — 99204 OFFICE O/P NEW MOD 45 MIN: CPT | Performed by: UROLOGY

## 2025-02-04 PROCEDURE — 51798 US URINE CAPACITY MEASURE: CPT | Performed by: UROLOGY

## 2025-02-04 PROCEDURE — 1159F MED LIST DOCD IN RCRD: CPT | Performed by: UROLOGY

## 2025-02-04 RX ORDER — TADALAFIL 5 MG/1
5 TABLET ORAL DAILY
Qty: 90 TABLET | Refills: 3 | Status: SHIPPED | OUTPATIENT
Start: 2025-02-04 | End: 2026-02-04

## 2025-02-04 RX ORDER — TADALAFIL 20 MG/1
20 TABLET ORAL DAILY PRN
Qty: 10 TABLET | Refills: 0 | Status: SHIPPED | OUTPATIENT
Start: 2025-02-04 | End: 2025-03-06

## 2025-02-04 ASSESSMENT — PAIN SCALES - GENERAL: PAINLEVEL_OUTOF10: 0-NO PAIN

## 2025-02-04 NOTE — PROGRESS NOTES
Subjective   Vishnu Waller is a 79 y.o. male presenting as a new patient today, kindly referred by Dr. Soto due to elevated PSA. Last PSA was 5.07 on 1/28/2025. Prostate MRI from 1/23/2025 demonstrated a 64.7 g prostate with a PI-RADS 3 lesion.     Patient has history of BPH with LUTS, mostly bothered by urinary frequency and nocturia x2. He has occasional intermittent urinary stream. He is currently on daily Cialis 5mg with good results. He tried Tamsulosin in the past with no response.     He notes erectile dysfunction, with low libido he has had partial response with Cialis 5mg. He has no family history of prostate cancer. Denies any recent gross hematuria, fevers, chills, urinary retention, intractable flank or abdominal pain, nausea or vomiting.          Past Medical History:   Diagnosis Date    Arthritis Many years ago    Cataract Surgery both eyes 2012    Glaucoma 08/2006    Other conditions influencing health status     History of back problems    Pain in unspecified knee     Knee pain    Stroke (Multi) 5/31/2018     Past Surgical History:   Procedure Laterality Date    CIRCUMCISION, PRIMARY  1945    EYE SURGERY  Cataracts 2012    HIP SURGERY  04/27/2018    Hip Surgery Right    JOINT REPLACEMENT  12/2017    MR HEAD ANGIO WO IV CONTRAST  06/01/2018    MR HEAD ANGIO WO IV CONTRAST 6/1/2018 [facility]    MR NECK ANGIO WO IV CONTRAST  06/01/2018    MR NECK ANGIO WO IV CONTRAST 6/1/2018 [facility] CLIN[facility]GAC[facility]    WISDOM TOOTH EXTRACTION  ~1963     Family History   Problem Relation Name Age of Onset    Heart disease Mother Mother     Stroke Mother Mother      Current Outpatient Medications   Medication Sig Dispense Refill    aspirin 81 mg chewable tablet Chew 1 tablet (81 mg) once daily.      atorvastatin (Lipitor) 40 mg tablet TAKE 1 TABLET BY MOUTH EVERY DAY 90 tablet 1    lisinopril 5 mg tablet TAKE 1 TABLET BY MOUTH EVERY DAY 90 tablet 1    tadalafil (Cialis) 5 mg tablet Take 1 tablet (5 mg) by mouth  once daily. 90 tablet 3     No current facility-administered medications for this visit.     Allergies   Allergen Reactions    Erythromycin Diarrhea     Social History     Socioeconomic History    Marital status:      Spouse name: Not on file    Number of children: Not on file    Years of education: Not on file    Highest education level: Not on file   Occupational History    Not on file   Tobacco Use    Smoking status: Former     Current packs/day: 0.00     Average packs/day: 1.5 packs/day for 22.0 years (33.0 ttl pk-yrs)     Types: Cigarettes     Start date: 1964     Quit date: 1986     Years since quittin.2    Smokeless tobacco: Never    Tobacco comments:     Dumb   Substance and Sexual Activity    Alcohol use: Never    Drug use: Never    Sexual activity: Yes     Partners: Female     Birth control/protection: None     Comment: No need for birth control / protection   Other Topics Concern    Not on file   Social History Narrative    Not on file     Social Drivers of Health     Financial Resource Strain: Not on file   Food Insecurity: Not on file   Transportation Needs: Not on file   Physical Activity: Not on file   Stress: Not on file   Social Connections: Not on file   Intimate Partner Violence: Not on file   Housing Stability: Not on file       Review of Systems  Pertinent items are noted in HPI.    Objective       Lab Review  Lab Results   Component Value Date    WBC 8.6 2025    RBC 5.15 2025    HGB 15.1 2025    HCT 47.4 2025     2025      Lab Results   Component Value Date    BUN 20 2025    CREATININE 0.93 2025      Lab Results   Component Value Date    PSA 5.07 (H) 01/10/2025     IPSS 9 and 3   PVR 0ml     Assessment/Plan   Diagnoses and all orders for this visit:  Benign prostatic hyperplasia with lower urinary tract symptoms, symptom details unspecified  -     Measure post void residual  Elevated PSA  -     PSA; Future  Male erectile  disorder (CODE)      Elevated PSA   PI-RADS 3    Last PSA was 5.07 on 1/28/2025.     I reviewed prostate MRI from 1/23/2025 which demonstrated a 64.7 g prostate with a PI-RADS 3 lesion.     I discussed with the patient that PSA measurements on patients in their advanced age, notably after the 70 year stanislaw are no longer recommended. Therefore, his PSA is considered normal and expected for his age and screening is not indicated.      We will recheck PSA in 6 months and follow up virtually to review.    3. BPH with LUTS     Patient reports his symptoms are not bothersome enough for intervention at this time.     We will continue to monitor.    Refill Cialis.    4. ED     I advised patient that he can take up to 20mg of Cialis on the days he is sexually active.      All questions were answered to the patient's satisfaction. Patient agrees with the plan and wishes to proceed. Follow-up will be scheduled appropriately.     E&M visit today is associated with current or anticipated ongoing medical care services related to a patient's single, serious condition or a complex condition.      Scribed for Dr. Avila by Cynthia Tran. I , Dr Avila, have personally reviewed and agreed with the information entered by the Virtual Scribe.

## 2025-04-17 DIAGNOSIS — N40.1 BENIGN PROSTATIC HYPERPLASIA WITH LOWER URINARY TRACT SYMPTOMS, SYMPTOM DETAILS UNSPECIFIED: ICD-10-CM

## 2025-04-19 RX ORDER — TADALAFIL 5 MG/1
5 TABLET ORAL DAILY
Qty: 90 TABLET | Refills: 1 | Status: SHIPPED | OUTPATIENT
Start: 2025-04-19 | End: 2025-10-16

## 2025-05-14 DIAGNOSIS — E78.5 HYPERLIPIDEMIA, UNSPECIFIED HYPERLIPIDEMIA TYPE: ICD-10-CM

## 2025-05-14 DIAGNOSIS — I10 PRIMARY HYPERTENSION: ICD-10-CM

## 2025-05-15 RX ORDER — LISINOPRIL 5 MG/1
5 TABLET ORAL DAILY
Qty: 90 TABLET | Refills: 3 | Status: SHIPPED | OUTPATIENT
Start: 2025-05-15

## 2025-05-15 RX ORDER — ATORVASTATIN CALCIUM 40 MG/1
40 TABLET, FILM COATED ORAL DAILY
Qty: 90 TABLET | Refills: 3 | Status: SHIPPED | OUTPATIENT
Start: 2025-05-15

## 2025-08-04 DIAGNOSIS — R97.20 ELEVATED PSA: ICD-10-CM

## 2025-08-18 ENCOUNTER — APPOINTMENT (OUTPATIENT)
Dept: UROLOGY | Facility: CLINIC | Age: 80
End: 2025-08-18
Payer: MEDICARE

## 2025-09-02 ENCOUNTER — APPOINTMENT (OUTPATIENT)
Dept: UROLOGY | Facility: CLINIC | Age: 80
End: 2025-09-02
Payer: MEDICARE

## 2025-09-05 LAB — PSA SERPL-MCNC: 3.68 NG/ML

## 2025-09-09 ENCOUNTER — APPOINTMENT (OUTPATIENT)
Dept: UROLOGY | Facility: CLINIC | Age: 80
End: 2025-09-09
Payer: MEDICARE

## 2026-01-08 ENCOUNTER — APPOINTMENT (OUTPATIENT)
Dept: PRIMARY CARE | Facility: CLINIC | Age: 81
End: 2026-01-08
Payer: MEDICARE

## 2026-01-29 ENCOUNTER — APPOINTMENT (OUTPATIENT)
Dept: PRIMARY CARE | Facility: CLINIC | Age: 81
End: 2026-01-29
Payer: MEDICARE